# Patient Record
Sex: MALE | Race: BLACK OR AFRICAN AMERICAN | NOT HISPANIC OR LATINO | Employment: FULL TIME | URBAN - METROPOLITAN AREA
[De-identification: names, ages, dates, MRNs, and addresses within clinical notes are randomized per-mention and may not be internally consistent; named-entity substitution may affect disease eponyms.]

---

## 2017-09-05 ENCOUNTER — OFFICE VISIT (OUTPATIENT)
Dept: URGENT CARE | Facility: CLINIC | Age: 27
End: 2017-09-05
Payer: COMMERCIAL

## 2017-09-05 VITALS
OXYGEN SATURATION: 100 % | WEIGHT: 150 LBS | BODY MASS INDEX: 21.47 KG/M2 | RESPIRATION RATE: 18 BRPM | TEMPERATURE: 98 F | DIASTOLIC BLOOD PRESSURE: 83 MMHG | HEIGHT: 70 IN | HEART RATE: 75 BPM | SYSTOLIC BLOOD PRESSURE: 121 MMHG

## 2017-09-05 DIAGNOSIS — J02.9 SORE THROAT: Primary | ICD-10-CM

## 2017-09-05 LAB
CTP QC/QA: YES
S PYO RRNA THROAT QL PROBE: NEGATIVE

## 2017-09-05 PROCEDURE — 3008F BODY MASS INDEX DOCD: CPT | Mod: S$GLB,,, | Performed by: FAMILY MEDICINE

## 2017-09-05 PROCEDURE — 87880 STREP A ASSAY W/OPTIC: CPT | Mod: QW,S$GLB,, | Performed by: FAMILY MEDICINE

## 2017-09-05 PROCEDURE — 99203 OFFICE O/P NEW LOW 30 MIN: CPT | Mod: S$GLB,,, | Performed by: FAMILY MEDICINE

## 2017-09-05 NOTE — PROGRESS NOTES
"Subjective:       Patient ID: Elias Jackson is a 26 y.o. male.    Vitals:  height is 5' 10" (1.778 m) and weight is 68 kg (150 lb). His oral temperature is 97.5 °F (36.4 °C). His blood pressure is 121/83 and his pulse is 75. His respiration is 18 and oxygen saturation is 100%.     Chief Complaint: Sore Throat    Sore Throat    This is a new problem. The current episode started in the past 7 days. The problem has been gradually worsening. There has been no fever. The pain is at a severity of 6/10. The pain is moderate. Associated symptoms include swollen glands and trouble swallowing. Pertinent negatives include no abdominal pain, congestion, coughing, ear pain, headaches, hoarse voice or shortness of breath. He has had no exposure to strep or mono. Treatments tried: dayquil and nyquil. The treatment provided mild relief.     Review of Systems   Constitution: Negative for chills, fever and malaise/fatigue.   HENT: Positive for sore throat and trouble swallowing. Negative for congestion, ear pain and hoarse voice.    Eyes: Negative for discharge and redness.   Cardiovascular: Negative for chest pain, dyspnea on exertion and leg swelling.   Respiratory: Negative for cough, shortness of breath, sputum production and wheezing.    Musculoskeletal: Negative for myalgias.   Gastrointestinal: Negative for abdominal pain and nausea.   Neurological: Negative for headaches.       Objective:      Physical Exam   Constitutional: He appears well-developed and well-nourished.   HENT:   Head: Normocephalic and atraumatic.   Mouth/Throat: Posterior oropharyngeal erythema present. No posterior oropharyngeal edema.   Neck: Normal range of motion. Neck supple.   Cardiovascular: Normal rate, regular rhythm and normal heart sounds.    Pulmonary/Chest: Effort normal and breath sounds normal.   Nursing note and vitals reviewed.      Assessment:       1. Sore throat        Plan:         Sore throat  -     POCT rapid strep A    recommend " OTC analgesia

## 2017-09-05 NOTE — PATIENT INSTRUCTIONS
Viral Pharyngitis (Sore Throat)    You (or your child, if your child is the patient) have pharyngitis (sore throat). This infection is caused by a virus. It can cause throat pain that is worse when swallowing, aching all over, headache, and fever. The infection may be spread by coughing, kissing, or touching others after touching your mouth or nose. Antibiotic medications do not work against viruses, so they are not used for treating this condition.  Home care  · If your symptoms are severe, rest at home. Return to work or school when you feel well enough.   · Drink plenty of fluids to avoid dehydration.  · For children: Use acetaminophen for fever, fussiness or discomfort. In infants over six months of age, you may use ibuprofen instead of acetaminophen. (NOTE: If your child has chronic liver or kidney disease or ever had a stomach ulcer or GI bleeding, talk with your doctor before using these medicines.) (NOTE: Aspirin should never be used in anyone under 18 years of age who is ill with a fever. It may cause severe liver damage.)   · For adults: You may use acetaminophen or ibuprofen to control pain or fever, unless another medicine was prescribed for this. (NOTE: If you have chronic liver or kidney disease or ever had a stomach ulcer or GI bleeding, talk with your doctor before using these medicines.)  · Throat lozenges or numbing throat sprays can help reduce pain. Gargling with warm salt water will also help reduce throat pain. For this, dissolve 1/2 teaspoon of salt in 1 glass of warm water. To help soothe a sore throat, children can sip on juice or a popsicle. Children 5 years and older can also suck on a lollipop or hard candy.  · Avoid salty or spicy foods, which can be irritating to the throat.  Follow-up care  Follow up with your healthcare provider or our staff if you are not improving over the next week.  When to seek medical advice  Call your healthcare provider right away if any of these  occur:  · Fever as directed by your doctor.  For children, seek care if:  ¨ Your child is of any age and has repeated fevers above 104°F (40°C).  ¨ Your child is younger than 2 years of age and has a fever of 100.4°F (38°C) that continues for more than 1 day.  ¨ Your child is 2 years old or older and has a fever of 100.4°F (38°C) that continues for more than 3 days.  · New or worsening ear pain, sinus pain, or headache  · Painful lumps in the back of neck  · Stiff neck  · Lymph nodes are getting larger  · Inability to swallow liquids, excessive drooling, or inability to open mouth wide due to throat pain  · Signs of dehydration (very dark urine or no urine, sunken eyes, dizziness)  · Trouble breathing or noisy breathing  · Muffled voice  · New rash  · Child appears to be getting sicker  Date Last Reviewed: 4/13/2015  © 1314-3359 [employer]. [address] [place] [address], [place] [address]. All rights reserved. This information is not intended as a substitute for professional medical care. Always follow your healthcare professional's instructions.

## 2020-01-24 ENCOUNTER — OFFICE VISIT (OUTPATIENT)
Dept: INTERNAL MEDICINE | Facility: CLINIC | Age: 30
End: 2020-01-24
Payer: COMMERCIAL

## 2020-01-24 VITALS
DIASTOLIC BLOOD PRESSURE: 90 MMHG | BODY MASS INDEX: 24.87 KG/M2 | SYSTOLIC BLOOD PRESSURE: 130 MMHG | WEIGHT: 173.75 LBS | HEIGHT: 70 IN | HEART RATE: 73 BPM | OXYGEN SATURATION: 98 %

## 2020-01-24 DIAGNOSIS — R03.0 ELEVATED BLOOD PRESSURE READING IN OFFICE WITHOUT DIAGNOSIS OF HYPERTENSION: ICD-10-CM

## 2020-01-24 DIAGNOSIS — Z00.00 ANNUAL PHYSICAL EXAM: Primary | ICD-10-CM

## 2020-01-24 DIAGNOSIS — Z13.1 SCREENING FOR DIABETES MELLITUS: ICD-10-CM

## 2020-01-24 DIAGNOSIS — Z13.220 SCREENING FOR LIPID DISORDERS: ICD-10-CM

## 2020-01-24 PROCEDURE — 99999 PR PBB SHADOW E&M-EST. PATIENT-LVL III: CPT | Mod: PBBFAC,,, | Performed by: FAMILY MEDICINE

## 2020-01-24 PROCEDURE — 99385 PR PREVENTIVE VISIT,NEW,18-39: ICD-10-PCS | Mod: S$GLB,,, | Performed by: FAMILY MEDICINE

## 2020-01-24 PROCEDURE — 99385 PREV VISIT NEW AGE 18-39: CPT | Mod: S$GLB,,, | Performed by: FAMILY MEDICINE

## 2020-01-24 PROCEDURE — 99999 PR PBB SHADOW E&M-EST. PATIENT-LVL III: ICD-10-PCS | Mod: PBBFAC,,, | Performed by: FAMILY MEDICINE

## 2020-01-24 NOTE — PROGRESS NOTES
"Subjective:       Patient ID: Elias Jackson is a 29 y.o. male.    Chief Complaint: Establish Care and Annual Exam    HPI  This patient is new to me.   Elias Jackson is a 29 y.o. year old male without significant PMH who presents today to establish care.    Patient is overall feeling well and has no concerns or complaints.  His blood pressure is elevated today.  He denies any history of hypertension.    Exercise - none  Diet - fast food often, fried chicken    Health Maintenance  Colon Cancer Screening: n/a  Prostate Cancer Screening: n/a  Hepatitis C screening: n/a  Flu vaccine: UTD  Tetanus vaccine: due  PNA vaccine: n/a  Shingles vaccine: n/a    I personally reviewed Past Medical History, Past Surgical History, Social History, and Family History    Review of Systems   Constitutional: Negative for chills, fatigue, fever and unexpected weight change.   HENT: Negative for congestion, hearing loss, rhinorrhea and sore throat.    Eyes: Negative for visual disturbance.   Respiratory: Negative for cough, shortness of breath and wheezing.    Cardiovascular: Negative for chest pain, palpitations and leg swelling.   Gastrointestinal: Negative for abdominal pain, constipation, diarrhea, nausea and vomiting.   Genitourinary: Negative for dysuria, frequency and urgency.   Musculoskeletal: Negative for arthralgias and myalgias.   Skin: Negative for rash.   Neurological: Negative for dizziness, syncope and headaches.   Psychiatric/Behavioral: Negative for dysphoric mood and sleep disturbance. The patient is not nervous/anxious.        Objective:      Vitals:    01/24/20 1357 01/24/20 1441   BP: (!) 142/102 (!) 130/90   Pulse: 73    SpO2: 98%    Weight: 78.8 kg (173 lb 11.6 oz)    Height: 5' 10" (1.778 m)      Physical Exam   Constitutional: He is oriented to person, place, and time. He appears well-developed and well-nourished. No distress.   HENT:   Head: Normocephalic and atraumatic.   Right Ear: Hearing normal.   Left " Ear: Hearing normal.   Nose: Nose normal.   Mouth/Throat: Oropharynx is clear and moist and mucous membranes are normal. Normal dentition. No oropharyngeal exudate.   Eyes: Pupils are equal, round, and reactive to light. Conjunctivae and lids are normal.   Neck: Normal range of motion. No thyroid mass and no thyromegaly present.   Cardiovascular: Normal rate, regular rhythm, S1 normal, S2 normal and intact distal pulses.   No murmur heard.  No lower extremity edema   Pulmonary/Chest: Effort normal and breath sounds normal. No respiratory distress.   Abdominal: Soft. Bowel sounds are normal. There is no tenderness.   Lymphadenopathy:     He has no cervical adenopathy.        Right: No supraclavicular adenopathy present.        Left: No supraclavicular adenopathy present.   Neurological: He is alert and oriented to person, place, and time. He is not disoriented.   Skin: Skin is warm and dry. No rash noted.   Psychiatric: He has a normal mood and affect. His behavior is normal. Thought content normal.   Nursing note and vitals reviewed.      Assessment:       1. Annual physical exam    2. Screening for diabetes mellitus    3. Screening for lipid disorders    4. Elevated blood pressure reading in office without diagnosis of hypertension        Plan:     Elias was seen today for establish care and annual exam.    Diagnoses and all orders for this visit:    Annual physical exam  Recommended Tdap vaccine.  -     CBC auto differential; Future  -     Comprehensive metabolic panel; Future  -     Lipid panel; Future  -     TSH; Future    Screening for diabetes mellitus  -     Comprehensive metabolic panel; Future    Screening for lipid disorders  -     Lipid panel; Future    Elevated blood pressure reading in office without diagnosis of hypertension  Discussed lifestyle changes including low sodium diet, limiting alcohol, avoiding tobacco use, regular exercise.  Advised patient to monitor blood pressure at home/work and he  will contact with an update on how his blood pressure is.

## 2020-01-24 NOTE — PATIENT INSTRUCTIONS
High Blood Pressure    A blood pressure reading is made up of two numbers: a higher number over a lower number. The top number is the systolic pressure. The bottom number is the diastolic pressure. A normal blood pressure is a systolic pressure of less than 120 over a diastolic pressure less than 80. You will see your blood pressure readings written together. For example, a person with a systolic pressure of 118 and a diastolic pressure of 78 will have 118/78 written in the medical record.  High blood pressure is when either the top number is 130 or higher, or the bottom number is 80 or higher. High blood pressure is diagnosed when multiple, separate readings show blood pressures above 130/80. The blood pressures between normal and high are called prehypertension.  Home care  If you have high blood pressure, you should do what is listed below to lower your blood pressure. If you are taking medicines for high blood pressure, these methods may reduce or end your need for medicines in the future.  · Begin a weight-loss program if you are overweight.  · Cut back on how much salt you get in your diet. Heres how to do this:  ¨ Dont eat foods that have a lot of salt. These include olives, pickles, smoked meats, and salted potato chips.  ¨ Dont add salt to your food at the table.  ¨ Use only small amounts of salt when cooking.  ¨ Review food labels to track how much salt is in prepared foods.  ¨ When eating out, ask that no additional salt be added to your food order.  · Begin an exercise program. Talk with your health care provider about the type of exercise program that would be best for you. It doesn't have to be hard. Even brisk walking for 20 minutes 3 times a week is a good form of exercise.  · Dont take medicines that have heart stimulants. This includes many over-the-counter cold and sinus decongestant pills and sprays, as well as diet pills. Check the warnings about hypertension on the label. Before purchasing  any over-the-counter medicines or supplements, always ask the pharmacist about the product's potential interaction with your high blood pressure and your high blood pressure medicines.  · Stimulants such as amphetamine or cocaine could be lethal for someone with high blood pressure. Never take these.  · Limit how much caffeine you get in your diet. Switch to caffeine-free products.  · Stop smoking. If you are a long-time smoker, this can be hard. Enroll in a stop-smoking program to make it more likely that you will quit for good.  · Learn how to handle stress. This is an important part of any program to lower blood pressure. Learn about relaxation methods like meditation, yoga, or biofeedback.  · If your provider prescribed medicines, take them exactly as directed. Missing doses may cause your blood pressure get out of control.  · If you miss a dose or doses, check with your healthcare provider or pharmacist about what to do.  · Consider buying an automatic blood pressure machine. Your provider can make a recommendation. You can get one of these at most pharmacies.  The American Heart Association recommends the following guidelines for home blood pressure monitoring:  · Don't smoke or drink coffee for 30 minutes before taking your blood pressure.  · Go to the bathroom before the test.  · Relax for 5 minutes before taking the measurement.  · Sit with your back supported (don't sit on a couch or soft chair); keep your feet on the floor uncrossed. Place your arm on a solid flat surface (like a table) with the upper part of the arm at heart level. Place the middle of the cuff directly above the eye of the elbow. Check the monitor's instruction manual for an illustration.  · Take multiple readings. When you measure, take 2 to 3 readings one minute apart and record all of the results.  · Take your blood pressure at the same time every day, or as your healthcare provider recommends.  · Record the date, time, and blood  pressure reading.  · Take the record with you to your next medical appointment. If your blood pressure monitor has a built-in memory, simply take the monitor with you to your next appointment.  · Call your provider if you have several high readings. Don't be frightened by a single high blood pressure reading, but if you get several high readings, check in with your healthcare provider.  · Note: When blood pressure reaches a systolic (top number) of 180 or higher OR diastolic (bottom number) of 110 or higher, seek emergency medical treatment.  Follow-up care  If you started a new blood pressure medicine today, its important that you have your blood pressure rechecked. This is to make sure that the medicine is working and that you have no serious side effects. Keep all your follow up appointments. Write down medicine and blood pressure questions and bring them to your next appointment. If you have pressing concerns about your new medicine or your blood pressure, call your provider.   When to seek medical advice  Call your healthcare provider right away if any of these occur:  · Blood pressure reaches a systolic (top number) of 180 or higher, OR diastolic (bottom number) of 110 or higher, seek emergency medical treatment.  · Chest pain or shortness of breath  · Severe headache  · Throbbing or rushing sound in the ears  · Nosebleed  · Sudden severe pain in your belly (abdomen)  · Extreme drowsiness, confusion, or fainting  · Dizziness or dizziness with a spinning sensation (vertigo)  · Weakness of an arm or leg or one side of the face  · You have problems speaking or seeing   Date Last Reviewed: 12/1/2016  © 1339-7727 [employer]. [address] [place] [address], [place] [address]. All rights reserved. This information is not intended as a substitute for professional medical care. Always follow your healthcare professional's instructions.      Controlling High Blood Pressure  High blood pressure (hypertension) is  often called the silent killer. This is because many people who have it dont know it. High blood pressure is defined as 130/80 mm Hg or higher. Know your blood pressure and remember to check it regularly. Doing so can save your life. Here are some things you can do to help control your blood pressure.    Choose heart-healthy foods  · Select low-salt, low-fat foods. Limit sodium intake to 2,000 mg per day or the amount suggested by your healthcare provider.  · Limit canned, dried, cured, packaged, and fast foods. These can contain a lot of salt.  · Eat 8 to 10 servings of fruits and vegetables every day.  · Choose lean meats, fish, or chicken.  · Eat whole-grain pasta, brown rice, and beans.  · Eat 2 to 3 servings of low-fat or fat-free dairy products.  · Ask your doctor about the DASH eating plan or Mediterranean diet. This plan helps reduce blood pressure.  · When you go to a restaurant, ask that your meal be prepared with no added salt.  Maintain a healthy weight  · Ask your healthcare provider how many calories to eat a day. Then stick to that number.  · Ask your healthcare provider what weight range is healthiest for you. If you are overweight, a weight loss of only 3% to 5% of your body weight can help lower blood pressure. Generally, a good weight loss goal is to lose 10% of your body weight in a year.  · Limit snacks and sweets.  · Get regular exercise.  Get up and get active  · Choose activities you enjoy. Find ones you can do with friends or family. This includes bicycling, dancing, walking, and jogging.  · Park farther away from building entrances.  · Use stairs instead of the elevator.  · When you can, walk or bike instead of driving.  · [place] leaves, garden, or do household repairs.  · Be active at a moderate to vigorous level of physical activity for at least 40 minutes for a minimum of 3 to 4 days a week.   Manage stress  · Make time to relax and enjoy life. Find time to laugh.  · Communicate your  concerns with your loved ones and your healthcare provider.  · Visit with family and friends, and keep up with hobbies.  Limit alcohol and quit smoking  · Men should have no more than 2 drinks per day.  · Women should have no more than 1 drink per day.  · Talk with your healthcare provider about quitting smoking. Smoking significantly increases your risk for heart disease and stroke. Ask your healthcare provider about community smoking cessation programs and other options.  Medicines  If lifestyle changes arent enough, your healthcare provider may prescribe high blood pressure medicine. Take all medicines as prescribed. If you have any questions about your medicines, ask your healthcare provider before stopping or changing them.   Date Last Reviewed: 4/27/2016 © 2000-2017 [employer]. [address] [place] [address], [place] [address]. All rights reserved. This information is not intended as a substitute for professional medical care. Always follow your healthcare professional's instructions.        Aerobic Exercise for a Healthy Heart  Exercise is a lot more than an energy booster and a stress reliever. It also strengthens your heart muscle, lowers your blood pressure and cholesterol, and burns calories. It can also improve your resting muscle tone, and your mood.     Remember, some activity is better than none.    Choose an aerobic activity  Choose an activity that makes your heart and lungs work harder than they do when you rest or walk normally. This aerobic exercise can improve the way your heart and other muscles use oxygen. Make it fun by exercising with a friend and choosing an activity you enjoy. Here are some ideas:  · Walking  · Swimming  · Bicycling  · Stair climbing  · Dancing  · Jogging  · Gardening  Exercise regularly  If you havent been exercising regularly,  get your doctors OK first. Then start slowly.  Here are some tips:  · Begin exercising 3 times a week for 5 to 10 minutes at a  time.  · When you feel comfortable, add a few minutes each session.  · Slowly build up to exercising 3 to 4 times each week. Each session should last for 40 minutes, on average, and involve moderate- to vigorous-intensity physical activity.  · If you have been given nitroglycerin, be sure to carry it when you exercise.  · If you get chest pain (angina) when youre exercising, stop what youre doing, take your nitroglycerin, and call your doctor.  Date Last Reviewed: 6/2/2016 © 2000-2017 [employer]. [address] [place] [address]. All rights reserved. This information is not intended as a substitute for professional medical care. Always follow your healthcare professional's instructions.        Eating Heart-Healthy Foods  Eating has a big impact on your heart health. In fact, eating healthier can improve several of your heart risks at once. For instance, it helps you manage weight, cholesterol, and blood pressure. Here are ideas to help you make heart-healthy changes without giving up all the foods and flavors you love.  Getting started  · Talk with your health care provider about eating plans, such as the DASH or Mediterranean diet. You may also be referred to a dietitian.  · Change a few things at a time. Give yourself time to get used to a few eating changes before adding more.  · Work to create a tasty, healthy eating plan that you can stick to for the rest of your life.    Goals for healthy eating  Below are some tips to improve your eating habits:  · Limit saturated fats and trans fats. Saturated fats raise your levels of cholesterol, so keep these fats to a minimum. They are found in foods such as fatty meats, whole milk, cheese, and palm and coconut oils. Avoid trans fats because they lower good cholesterol as well as raise bad cholesterol. Trans fats are most often found in processed foods.  · Reduce sodium (salt) intake. Eating too much salt may increase your blood pressure. Limit  your sodium intake to 2,000 milligrams (mg) per day, or less if your health care provider recommends it. Dining out less often and eating fewer processed foods are two great ways to decrease the amount of salt you consume.  · Managing calories. A calorie is a unit of energy. Your body burns calories for fuel, but if you eat more calories than your body burns, the extras are stored as fat. Your health care provider can help you create a diet plan to manage your calories. This will likely include eating healthier foods as well as exercising regularly. To help you track your progress, keep a diary to record what you eat and how often you exercise.  Choose the right foods  Aim to make these foods staples of your diet. If you have diabetes, you may have different recommendations than what is listed here:  · Fruits and vegetable provide plenty of nutrients without a lot of calories. At meals, fill half your plate with these foods. Split the other half of your plate between whole grains and lean protein.  · Whole grains are high in fiber and rich in vitamins and nutrients. Good choices include whole-wheat bread, pasta, and brown rice.  · Lean proteins give you nutrition with less fat. Good choices include fish, skinless chicken, and beans.  · Low-fat or nonfat dairy provides nutrients without a lot of fat. Try low-fat or nonfat milk, cheese, or yogurt.  · Healthy fats can be good for you in small amounts. These are unsaturated fats, such as olive oil, nuts, and fish. Try to have at least 2 servings per week of fatty fish such as salmon, sardines, mackerel, rainbow trout, and albacore tuna. These contain omega-3 fatty acids, which are good for your heart. Flaxseed is another source of a heart-healthy fat.  More on heart healthy eating    Read food labels  Healthy eating starts at the grocery store. Be sure to pay attention to food labels on packaged foods. Look for products that are high in fiber and protein, and low in  saturated fat, cholesterol, and sodium. Avoid products that contain trans fat. And pay close attention to serving size. For instance, if you plan to eat two servings, double all the numbers on the label.  Prepare food right  A key part of healthy cooking is cutting down on added fat and salt. Look on the internet for lower-fat, lower-sodium recipes. Also, try these tips:  · Remove fat from meat and skin from poultry before cooking.  · Skim fat from the surface of soups and sauces.  · Broil, boil, bake, steam, grill, and microwave food without added fats.  · Choose ingredients that spice up your food without adding calories, fat, or sodium. Try these items: horseradish, hot sauce, lemon, mustard, nonfat salad dressings, and vinegar. For salt-free herbs and spices, try basil, cilantro, cinnamon, pepper, and rosemary.  Date Last Reviewed: 6/25/2015 © 2000-2017 [employer]. [address] [place] [address]. All rights reserved. This information is not intended as a substitute for professional medical care. Always follow your healthcare professional's instructions.        Taking Your Blood Pressure  Blood pressure is the force of blood against the artery wall as it moves from the heart through the blood vessels. You can take your own blood pressure reading using a digital monitor. Take your readings the same each time, using the same arm. Take readings as often as your healthcare provider instructs.  About blood pressure monitors  Blood pressure monitors are designed for certain ages and cases. You can find monitors for older adults, for pregnant women, and for children. Make sure the one you choose is the right one for your age and situation.  The American Heart Association recommends an automatic cuff monitor that fits on your upper arm (bicep). The cuff should fit your arm size. A cuff thats too large or too small will not give an accurate reading. Measure around your upper arm to find your  size.  Monitors that attach to your finger or wrist are not as accurate as monitors for your upper arm.  Ask your healthcare provider for help in choosing a monitor. Bring your monitor to your next provider visit if you need help in using it the correct way.  The steps below are general instructions for using an automatic digital monitor.  Step 1. Relax    · Take your blood pressure at the same time every day, such as in the morning or evening, or at the time your healthcare provider recommends.  · Wait at least a half-hour after smoking, eating, or exercising. Don't drink coffee, tea, soda, or other caffeinated beverages before checking your blood pressure.  · Sit comfortably at a table with both feet on the floor. Do not cross your legs or feet. Place the monitor near you.  · Rest for a few minutes before you begin.  Step 2. Wrap the cuff    · Place your arm on the table, palm up. Your arm should be at the level of your heart. Wrap the cuff around your upper arm, just above your elbow. Its best done on bare skin, not over clothing. Most cuffs will indicate where the brachial artery (the blood vessel in the middle of the arm at the inner side of the elbow) should line up with the cuff. Look in your monitor's instruction booklet for an illustration. You can also bring your cuff to your healthcare provider and have them show you how to correctly place the cuff.  Step 3. Inflate the cuff    · Push the button that starts the pump.  · The cuff will tighten, then loosen.  · The numbers will change. When they stop changing, your blood pressure reading will appear.  · Take 2 or 3 readings one minute apart.  Step 4. Write down the results of each reading    · Write down your blood pressure numbers for each reading. Note the date and time. Keep your results in one place, such as a notebook. Even if your monitor has a built-in memory, keep a hard copy of the readings.  · Remove the cuff from your arm. Turn off the  machine.  · Bring your blood pressure records with your healthcare providers at each visit.  · If you start a new blood pressure medicine, note the day you started the new medicine. Also note the day if you change the dose of your medicine. This information goes on your blood pressure recording sheet. This will help your healthcare provider monitor how well the medicine changes are working.  · Ask your healthcare provider what numbers should prompt you to call him or her. Also ask what numbers should prompt you to get help right away.  Date Last Reviewed: 11/1/2016 © 2000-2017 [employer]. [address] [place] [address]. All rights reserved. This information is not intended as a substitute for professional medical care. Always follow your healthcare professional's instructions.

## 2020-04-21 DIAGNOSIS — Z01.84 ANTIBODY RESPONSE EXAMINATION: ICD-10-CM

## 2020-05-21 DIAGNOSIS — Z01.84 ANTIBODY RESPONSE EXAMINATION: ICD-10-CM

## 2020-06-20 DIAGNOSIS — Z01.84 ANTIBODY RESPONSE EXAMINATION: ICD-10-CM

## 2020-07-20 DIAGNOSIS — Z01.84 ANTIBODY RESPONSE EXAMINATION: ICD-10-CM

## 2020-08-16 ENCOUNTER — OFFICE VISIT (OUTPATIENT)
Dept: URGENT CARE | Facility: CLINIC | Age: 30
End: 2020-08-16
Payer: COMMERCIAL

## 2020-08-16 VITALS
DIASTOLIC BLOOD PRESSURE: 87 MMHG | BODY MASS INDEX: 24.82 KG/M2 | WEIGHT: 173 LBS | SYSTOLIC BLOOD PRESSURE: 131 MMHG | OXYGEN SATURATION: 99 % | RESPIRATION RATE: 18 BRPM | TEMPERATURE: 98 F | HEART RATE: 90 BPM

## 2020-08-16 DIAGNOSIS — R31.9 HEMATURIA, UNSPECIFIED TYPE: Primary | ICD-10-CM

## 2020-08-16 LAB
BILIRUB UR QL STRIP: NEGATIVE
GLUCOSE UR QL STRIP: NEGATIVE
KETONES UR QL STRIP: NEGATIVE
LEUKOCYTE ESTERASE UR QL STRIP: NEGATIVE
PH, POC UA: 6
POC BLOOD, URINE: POSITIVE
POC NITRATES, URINE: NEGATIVE
PROT UR QL STRIP: NEGATIVE
SP GR UR STRIP: 1.02 (ref 1–1.03)
UROBILINOGEN UR STRIP-ACNC: ABNORMAL (ref 0.3–2.2)

## 2020-08-16 PROCEDURE — 87491 CHLMYD TRACH DNA AMP PROBE: CPT

## 2020-08-16 PROCEDURE — 96372 THER/PROPH/DIAG INJ SC/IM: CPT | Mod: S$GLB,,, | Performed by: NURSE PRACTITIONER

## 2020-08-16 PROCEDURE — 81003 URINALYSIS AUTO W/O SCOPE: CPT | Mod: QW,S$GLB,, | Performed by: NURSE PRACTITIONER

## 2020-08-16 PROCEDURE — 96372 PR INJECTION,THERAP/PROPH/DIAG2ST, IM OR SUBCUT: ICD-10-PCS | Mod: S$GLB,,, | Performed by: NURSE PRACTITIONER

## 2020-08-16 PROCEDURE — 99214 OFFICE O/P EST MOD 30 MIN: CPT | Mod: 25,S$GLB,, | Performed by: NURSE PRACTITIONER

## 2020-08-16 PROCEDURE — 87086 URINE CULTURE/COLONY COUNT: CPT

## 2020-08-16 PROCEDURE — 99214 PR OFFICE/OUTPT VISIT, EST, LEVL IV, 30-39 MIN: ICD-10-PCS | Mod: 25,S$GLB,, | Performed by: NURSE PRACTITIONER

## 2020-08-16 PROCEDURE — 81003 POCT URINALYSIS, DIPSTICK, AUTOMATED, W/O SCOPE: ICD-10-PCS | Mod: QW,S$GLB,, | Performed by: NURSE PRACTITIONER

## 2020-08-16 RX ORDER — AZITHROMYCIN 250 MG/1
TABLET, FILM COATED ORAL
Qty: 4 TABLET | Refills: 0 | Status: SHIPPED | OUTPATIENT
Start: 2020-08-16

## 2020-08-16 RX ORDER — AZITHROMYCIN 250 MG/1
TABLET, FILM COATED ORAL
Qty: 4 TABLET | Refills: 0 | Status: SHIPPED | OUTPATIENT
Start: 2020-08-16 | End: 2020-08-16

## 2020-08-16 RX ORDER — CEFTRIAXONE 250 MG/1
250 INJECTION, POWDER, FOR SOLUTION INTRAMUSCULAR; INTRAVENOUS
Status: COMPLETED | OUTPATIENT
Start: 2020-08-16 | End: 2020-08-16

## 2020-08-16 RX ADMIN — CEFTRIAXONE 250 MG: 250 INJECTION, POWDER, FOR SOLUTION INTRAMUSCULAR; INTRAVENOUS at 02:08

## 2020-08-16 NOTE — PATIENT INSTRUCTIONS
Rocephin given here in clinic.  Go ahead and take 4 azithromycin pills at once.  Abstain from intercourse for 7 days post treatment.  Cultures pending, we will call you with these results.  As discussed the Gonorrhea and Chlamydia test may take longer.  Follow up with PCP or urology for blood in your urine.  A referral was placed for you, call [phone] to schedule appointment.  Stay hydrated.  Go to the ER for any increasing symptoms including pain to your flank or pelvic region.    Blood in the Urine    Blood in the urine (hematuria) has many possible causes. If it occurs after an injury (such as a car accident or fall), it is most often a sign of bruising to the kidney or bladder. Common causes of blood in the urine include urinary tract infections, kidney stones, inflammation, tumors, or certain other diseases of the kidney or bladder. Menstruation can cause blood to appear in the urine sample, although it is not coming from the urinary tract.  If only a trace amount of blood is present, it will show up on the urine test, even though the urine may be yellow and not pink or red. This may occur with any of the above conditions, as well as heavy exercise or high fever. In this case, your doctor may want to repeat the urine test on another day. This will show if the blood is still present. If it is, then other tests can be done to find out the cause.  Home care  Follow these home care guidelines:  · If your urine does not appear bloody (pink, brown or red) then you do not need to restrict your activity in any way.  · If you can see blood in your urine, rest and avoid heavy exertion until your next exam. Do not use aspirin, blood thinners, or anti-platelet or anti-inflammatory medicines. These include ibuprofen and naproxen. These thin the blood and may increase bleeding.  Follow-up care  Follow up with your healthcare provider, or as advised. If you were injured and had blood in your urine, you should have a repeat  urine test in 1 to 2 days. Contact your doctor for this test.  A radiologist will review any X-rays that were taken. You will be told of any new findings that may affect your care.  When to seek medical advice  Call your healthcare provider right away if any of these occur:  · Bright red blood or blood clots in the urine (if you did not have this before)  · Weakness, dizziness or fainting  · New groin, abdominal, or back pain  · Fever of 100.4ºF (38ºC) or higher, or as directed by your healthcare provider  · Repeated vomiting  · Bleeding from the nose or gums or easy bruising  Date Last Reviewed: 9/1/2016  © 4070-9612 [employer]. [address] [place] [address], [place] [address]. All rights reserved. This information is not intended as a substitute for professional medical care. Always follow your healthcare professional's instructions.

## 2020-08-16 NOTE — PROGRESS NOTES
Subjective:       Patient ID: Elias Jackson is a 29 y.o. male.    Vitals:  weight is 78.5 kg (173 lb). His temperature is 98.4 °F (36.9 °C). His blood pressure is 131/87 and his pulse is 90. His respiration is 18 and oxygen saturation is 99%.     Chief Complaint: Hematuria    Patient presents with concern for blood in his urine.  He noted this Saturday (yesterday) afternoon on a towel and thought maybe the towel was dirty as he was staying in a hotel.  States that he noticed it again Saturday evening.  He did notice this after having an erection and having protected vaginal sex (condom) with a new female partner on Saturday.  He did have unprotected oral sex with a female partner about two weeks ago, unsure of an STD contact.  Denies a history of STDs.  Denies any associated pain with urination, flank pain, abdominal pain, testicular pain, or pelvic pain.  Denies a history of kidney stones.      Hematuria  This is a new problem. The current episode started yesterday. The problem is unchanged. He is experiencing no pain. Urine color: bright red. Irritative symptoms do not include frequency, nocturia or urgency. Pertinent negatives include no abdominal pain, chills, dysuria, facial swelling, fever, flank pain, genital pain, hesitancy, inability to urinate, nausea, vomiting or sore throat. He is sexually active. There is no history of hypertension,  trauma, kidney stones, recent infection, sickle cell disease, STDs, tobacco use or UTI.       Constitution: Negative for chills and fever.   HENT: Negative for facial swelling and sore throat.    Neck: Negative for painful lymph nodes.   Gastrointestinal: Negative for abdominal pain, nausea and vomiting.   Genitourinary: Positive for hematuria. Negative for dysuria, frequency, urgency, urine decreased, flank pain, history of kidney stones, genital trauma, painful intercourse, genital sore, penile discharge, painful ejaculation, penile pain, penile swelling, scrotal  swelling and testicular pain.   Musculoskeletal: Negative for back pain.   Skin: Negative for rash and lesion.   Hematologic/Lymphatic: Negative for swollen lymph nodes.       Objective:      Physical Exam   Constitutional: He is oriented to person, place, and time. He appears well-developed. No distress.   HENT:   Head: Normocephalic and atraumatic.   Ears:   Right Ear: External ear normal.   Left Ear: External ear normal.   Nose: Nose normal. No nasal deformity. No epistaxis.   Mouth/Throat: Oropharynx is clear and moist and mucous membranes are normal.   Eyes: Conjunctivae and lids are normal.   Neck: Trachea normal, normal range of motion and phonation normal. Neck supple.   Cardiovascular: Normal rate, regular rhythm and normal heart sounds.   Pulmonary/Chest: Effort normal and breath sounds normal.   Abdominal: Soft. Normal appearance and bowel sounds are normal. He exhibits no distension. There is no abdominal tenderness.   Genitourinary:    Testes normal.   Cremasteric reflex is present. Circumcised. No phimosis, paraphimosis, hypospadias, penile erythema, penile tenderness or penile swelling. Penis exhibits no lesions. No discharge found.   Musculoskeletal: Normal range of motion.   Neurological: He is alert and oriented to person, place, and time. He has normal reflexes.   Skin: Skin is warm, dry, intact and not diaphoretic. Psychiatric: His speech is normal and behavior is normal. Judgment and thought content normal.   Nursing note and vitals reviewed.    Results for orders placed or performed in visit on 08/16/20   POCT Urinalysis, Dipstick, Automated, W/O Scope   Result Value Ref Range    POC Blood, Urine Positive (A) Negative    POC Bilirubin, Urine Negative Negative    POC Urobilinogen, Urine abnormal 0.3 - 2.2    POC Ketones, Urine Negative Negative    POC Protein, Urine Negative Negative    POC Nitrates, Urine Negative Negative    POC Glucose, Urine Negative Negative    pH, UA 6.0     POC Specific  Gravity, Urine 1.020 1.003 - 1.029    POC Leukocytes, Urine Negative Negative         Assessment:       1. Hematuria, unspecified type        Plan:       Poct UA does show 250rbcs to urine.  Due to Covid 19 there is currently a delay time on GC testing.  Patient does not know of any GC contact but does admit to unprotected oral sex about 14 days ago.  I discussed options with him and given possible delay on results we will go ahead and cover him for a GC cause of possible Urethritis associated with his hematuria.  Hematuria, unspecified type  -     POCT Urinalysis, Dipstick, Automated, W/O Scope  -     Urine culture  -     C. trachomatis/N. gonorrhoeae by AMP DNA  -     cefTRIAXone injection 250 mg  -     Discontinue: azithromycin (Z-INDRA) 250 MG tablet; 4 pills po once  Dispense: 4 tablet; Refill: 0  -     Ambulatory referral/consult to Urology  -     azithromycin (Z-INDRA) 250 MG tablet; 4 pills po once  Dispense: 4 tablet; Refill: 0      Patient Instructions   Rocephin given here in clinic.  Go ahead and take 4 azithromycin pills at once.  Abstain from intercourse for 7 days post treatment.  Cultures pending, we will call you with these results.  As discussed the Gonorrhea and Chlamydia test may take longer.  Follow up with PCP or urology for blood in your urine.  A referral was placed for you, call [phone] to schedule appointment.  Stay hydrated.  Go to the ER for any increasing symptoms including pain to your flank or pelvic region.    Blood in the Urine    Blood in the urine (hematuria) has many possible causes. If it occurs after an injury (such as a car accident or fall), it is most often a sign of bruising to the kidney or bladder. Common causes of blood in the urine include urinary tract infections, kidney stones, inflammation, tumors, or certain other diseases of the kidney or bladder. Menstruation can cause blood to appear in the urine sample, although it is not coming from the urinary tract.  If only a  trace amount of blood is present, it will show up on the urine test, even though the urine may be yellow and not pink or red. This may occur with any of the above conditions, as well as heavy exercise or high fever. In this case, your doctor may want to repeat the urine test on another day. This will show if the blood is still present. If it is, then other tests can be done to find out the cause.  Home care  Follow these home care guidelines:  · If your urine does not appear bloody (pink, brown or red) then you do not need to restrict your activity in any way.  · If you can see blood in your urine, rest and avoid heavy exertion until your next exam. Do not use aspirin, blood thinners, or anti-platelet or anti-inflammatory medicines. These include ibuprofen and naproxen. These thin the blood and may increase bleeding.  Follow-up care  Follow up with your healthcare provider, or as advised. If you were injured and had blood in your urine, you should have a repeat urine test in 1 to 2 days. Contact your doctor for this test.  A radiologist will review any X-rays that were taken. You will be told of any new findings that may affect your care.  When to seek medical advice  Call your healthcare provider right away if any of these occur:  · Bright red blood or blood clots in the urine (if you did not have this before)  · Weakness, dizziness or fainting  · New groin, abdominal, or back pain  · Fever of 100.4ºF (38ºC) or higher, or as directed by your healthcare provider  · Repeated vomiting  · Bleeding from the nose or gums or easy bruising  Date Last Reviewed: 9/1/2016 © 2000-2017 [employer]. [address] [place] [address], [place] [address]. All rights reserved. This information is not intended as a substitute for professional medical care. Always follow your healthcare professional's instructions.

## 2020-08-18 LAB — BACTERIA UR CULT: NO GROWTH

## 2020-08-19 DIAGNOSIS — Z01.84 ANTIBODY RESPONSE EXAMINATION: ICD-10-CM

## 2020-08-21 ENCOUNTER — OFFICE VISIT (OUTPATIENT)
Dept: UROLOGY | Facility: CLINIC | Age: 30
End: 2020-08-21
Payer: COMMERCIAL

## 2020-08-21 VITALS
HEIGHT: 70 IN | HEART RATE: 77 BPM | SYSTOLIC BLOOD PRESSURE: 120 MMHG | DIASTOLIC BLOOD PRESSURE: 84 MMHG | WEIGHT: 160.94 LBS | BODY MASS INDEX: 23.04 KG/M2

## 2020-08-21 DIAGNOSIS — R31.0 GROSS HEMATURIA: Primary | ICD-10-CM

## 2020-08-21 PROCEDURE — 99999 PR PBB SHADOW E&M-EST. PATIENT-LVL III: CPT | Mod: PBBFAC,,, | Performed by: NURSE PRACTITIONER

## 2020-08-21 PROCEDURE — 99203 OFFICE O/P NEW LOW 30 MIN: CPT | Mod: S$GLB,,, | Performed by: NURSE PRACTITIONER

## 2020-08-21 PROCEDURE — 99999 PR PBB SHADOW E&M-EST. PATIENT-LVL III: ICD-10-PCS | Mod: PBBFAC,,, | Performed by: NURSE PRACTITIONER

## 2020-08-21 PROCEDURE — 99203 PR OFFICE/OUTPT VISIT, NEW, LEVL III, 30-44 MIN: ICD-10-PCS | Mod: S$GLB,,, | Performed by: NURSE PRACTITIONER

## 2020-08-21 PROCEDURE — 3008F BODY MASS INDEX DOCD: CPT | Mod: CPTII,S$GLB,, | Performed by: NURSE PRACTITIONER

## 2020-08-21 PROCEDURE — 3008F PR BODY MASS INDEX (BMI) DOCUMENTED: ICD-10-PCS | Mod: CPTII,S$GLB,, | Performed by: NURSE PRACTITIONER

## 2020-08-21 PROCEDURE — 88112 CYTOPATH CELL ENHANCE TECH: CPT | Performed by: PATHOLOGY

## 2020-08-21 PROCEDURE — 88112 PR  CYTOPATH, CELL ENHANCE TECH: ICD-10-PCS | Mod: 26,,, | Performed by: PATHOLOGY

## 2020-08-21 PROCEDURE — 88112 CYTOPATH CELL ENHANCE TECH: CPT | Mod: 26,,, | Performed by: PATHOLOGY

## 2020-08-21 RX ORDER — LIDOCAINE HYDROCHLORIDE 20 MG/ML
JELLY TOPICAL ONCE
Status: CANCELLED | OUTPATIENT
Start: 2020-08-21 | End: 2020-08-21

## 2020-08-21 RX ORDER — CIPROFLOXACIN 250 MG/1
500 TABLET, FILM COATED ORAL ONCE
Status: CANCELLED | OUTPATIENT
Start: 2020-08-21 | End: 2020-08-21

## 2020-08-21 NOTE — PROGRESS NOTES
Subjective:       Patient ID: Elias Jackson is a 29 y.o. male.    Chief Complaint: Hematuria (Saw blood x 2 days, dayana ford)    Elias Jackson is a 29 y.o. male new to Urology.  He is here today after experiencing 2 days of gross hematuria.  There was a little distal penis pain but basically painless; no clots.  This occurred after sexual encounter; he wore a condom and experienced no pain during sex.    He went to an Urgent Care on 3rd day (08/16/2020), but urine was clear.  They treated him for G/C; that test is still processing.  Urine culture was (-).    No history of stones, STD.  No family history of Urological Cancers.                  Review of Systems   Constitutional: Negative for activity change, appetite change, chills and fever.   HENT: Negative for facial swelling and trouble swallowing.    Eyes: Negative for visual disturbance.   Respiratory: Negative for chest tightness and shortness of breath.    Cardiovascular: Negative for chest pain and palpitations.   Gastrointestinal: Negative.  Negative for abdominal pain, constipation, diarrhea, nausea and vomiting.   Genitourinary: Positive for hematuria. Negative for difficulty urinating, dysuria, flank pain, penile pain, penile swelling, scrotal swelling and testicular pain.        FOS is good.  Pleased with how she urinates.     Musculoskeletal: Negative for back pain, gait problem, myalgias and neck stiffness.   Skin: Negative for rash.   Neurological: Negative for dizziness and speech difficulty.   Hematological: Does not bruise/bleed easily.   Psychiatric/Behavioral: Negative for behavioral problems.       Objective:      Physical Exam   Nursing note and vitals reviewed.  Constitutional: He is oriented to person, place, and time. He appears well-developed. He is cooperative.  Non-toxic appearance.   Urine dipped clear of infection/blood in the office today.     HENT:   Head: Normocephalic and atraumatic.   Right Ear: External ear normal.   Left Ear:  External ear normal.   Nose: Nose normal.   Eyes: Conjunctivae and lids are normal. No scleral icterus.   Neck: Trachea normal, normal range of motion and full passive range of motion without pain. Neck supple. No JVD present. No tracheal deviation present.   Cardiovascular: Normal rate, S1 normal and S2 normal.    Pulmonary/Chest: Effort normal. No respiratory distress. He exhibits no tenderness.   Abdominal: Soft. Normal appearance. There is no abdominal tenderness.   Genitourinary:    Testes and penis normal.   Right testis shows no mass, no swelling and no tenderness. Left testis shows no mass, no swelling and no tenderness. Circumcised. No hypospadias, penile erythema or penile tenderness. No discharge found.   Musculoskeletal: Normal range of motion.   Lymphadenopathy: No inguinal adenopathy noted on the right or left side.   Neurological: He is alert and oriented to person, place, and time.   Skin: Skin is warm and dry.     Psychiatric: His behavior is normal. Judgment and thought content normal.       Assessment:       1. Gross hematuria        Plan:         I spent 25 minutes with the patient of which more than half was spent in direct consultation with the patient in regards to our treatment and plan.    Education and recommendations of today's plan of care including home remedies.  We discussed his LUTS and possible contributory factors.  Discussed that with visible blood we are concerned that it is bladder cancer; very rare that someone his age has bladder cancer.  Urine was sent for cytology  Ct Urogram  Cysto ordered but will hold off for now.

## 2020-08-21 NOTE — PATIENT INSTRUCTIONS
What is Hematuria?  Blood in your urine is a condition known as hematuria. Most of the time, the cause of hematuria is not serious. But, never ignore blood in the urine. Your doctor can evaluate you to find the cause of the bleeding and treat it, if needed.  Types of hematuria  · Gross hematuria means that the blood can be seen by the naked eye. The urine may look pinkish, brownish, or bright red.  · Microscopic hematuria means that the urine is clear, but blood cells can be seen when urine is looked at under a microscope or tested in a lab.  Both types of hematuria can have the same causes. Neither one is necessarily more serious than the other. With either type, you may have other symptoms, such as pain, pressure, or burning when you urinate, abdominal pain, or back pain. Or, you may not have any other symptoms. No matter how much blood is found, the cause of the bleeding needs to be identified.  Finding the cause of hematuria  To evaluate your condition, your doctor will first confirm that blood is indeed present. Then other tests are done to pinpoint where the blood is coming from and why. Your doctor will decide which tests will best determine the cause of your hematuria. Some common tests are listed below.  · History and physical exam  · Lab tests may include urinalysis, a urine culture, a urine cytology, and various blood tests  · Cystoscopy  · Computed tomography (CT) or CT urography  · Magnetic resonance imaging (MRI) or MR urography  · Ultrasound of the kidney  · Kidney biopsy  Causes of hematuria include the very benign (exercised induced hematuria) to the very severe (cancer of the urinary system). A variety of treatments are available depending on the cause.  Date Last Reviewed: 12/2/2016 © 2000-2017 [employer]. [address] [place] [id number]. All rights reserved. This information is not intended as a substitute for professional medical care. Always follow your healthcare  professional's instructions.        Hematuria: Possible Causes     Many things can lead to blood in the urine (hematuria). The blood may be seen with the eye (macroscopic or gross hematuria). Or it may only be seen when the urine is looked at under a microscope (microscopic hematuria). Some of the most common causes of blood in the urine are listed below. Often, no cause for the blood can be found. This is called idiopathic hematuria.  · Kidney or bladder stones are collections of crystals. They form in the urine. Stones may be found anywhere in the urinary tract. But they form most often in the kidneys or bladder. In addition to blood in the urine, they can cause severe pain.  · BPH stands for benign prostatic hyperplasia. It is enlargement of the prostate gland. It happens as men age. BPH often causes problems with urination. It sometimes causes blood in the urine.  · A urinary tract infection (UTI) is due to bacteria growing in the urinary tract. It can cause blood in the urine. Other symptoms include burning or pain with urination. You may need to urinate often or urgently. You may also have a fever.  · Damage to the urinary tract may cause blood in the urine. This damage may be due to a blow or accident. It may also result from the use of a urinary catheter. Very hard exercise may sometimes irritate the urinary tract and cause bleeding.  · Cancer may occur anywhere in the urinary tract. A tumor may sometimes cause no symptoms other than bleeding.     Other possible causes of bleeding include:  · Prostatitis (infection of the prostate gland)  · Taking anticoagulants  · Blockage in the urinary tract  · Disease or inflammation of the kidney  · Cystic diseases of the kidneys  · Sickle cell anemia  · Vigorous exercise  · Endometriosis  Date Last Reviewed: 12/1/2016 © 2000-2017 [employer]. [address] [place] [address], [place] [address]. All rights reserved. This information is not intended as a substitute  for professional medical care. Always follow your healthcare professional's instructions.        Cystoscopy    Cystoscopy is a procedure that lets your doctor look directly inside your urethra and bladder. It can be used to:  · Help diagnose a problem with your urethra, bladder, or kidneys.  · Take a sample (biopsy) of bladder or urethral tissue.  · Treat certain problems (such as removing kidney stones).  · Place a stent to bypass an obstruction.  · Take special X-rays of the kidneys.  Based on the findings, your doctor may recommend other tests or treatments.  What is a cystoscope?  A cystoscope is a telescope-like instrument that contains lenses and fiberoptics (small glass wires that make bright light). The cystoscope may be straight and rigid, or flexible to bend around curves in the urethra. The doctor may look directly into the cystoscope, or project the image onto a monitor.  Getting ready  · Ask your doctor if you should stop taking any medicines before the procedure.  · Ask whether you should avoid eating or drinking anything after midnight before the procedure.  · Follow any other instructions your doctor gives you.  Tell your doctor before the exam if you:  · Take any medicines, such as aspirin or blood thinners  · Have allergies to any medicines  · Are pregnant   The procedure  Cystoscopy is done in the doctors office, surgery center, or hospital. The doctor and a nurse are present during the procedure. It takes only a few minutes, longer if a biopsy, X-ray, or treatment needs to be done.  During the procedure:  · You lie on an exam table on your back, knees bent and legs apart. You are covered with a drape.  · Your urethra and the area around it are washed. Anesthetic jelly may be applied to numb the urethra. Other pain medicine is usually not needed. In some cases, you may be offered a mild sedative to help you relax. If a more extensive procedure is to be done, such as a biopsy or kidney stone  removal, general anesthesia may be needed.  · The cystoscope is inserted. A sterile fluid is put into the bladder to expand it. You may feel pressure from this fluid.  · When the procedure is done, the cystoscope is removed.  After the procedure  If you had a sedative, general anesthesia, or spinal anesthesia, you must have someone drive you home. Once youre home:  · Drink plenty of fluids.  · You may have burning or light bleeding when you urinate--this is normal.  · Medicines may be prescribed to ease any discomfort or prevent infection. Take these as directed.  · Call your doctor if you have heavy bleeding or blood clots, burning that lasts more than a day, a fever over 100°F  (38° C), or trouble urinating.  Date Last Reviewed: 1/1/2017  © 3051-3624 [employer]. [address] [place] [address], [place] [address]. All rights reserved. This information is not intended as a substitute for professional medical care. Always follow your healthcare professional's instructions.

## 2020-08-21 NOTE — LETTER
August 21, 2020      Alee Dugan, ISRA  [address] [facility] [id number]           [facility]  [address]  [place] [address]  Phone: [phone]          Patient: Elias Jackson   MR Number: [id number]   YOB: 1990   Date of Visit: 8/21/2020       Dear Alee Dugan:    Thank you for referring Elias Jackson to me for evaluation. Attached you will find relevant portions of my assessment and plan of care.    If you have questions, please do not hesitate to call me. I look forward to following Elias Jackson along with you.    Sincerely,    Lidya Barrios, ISRA    Enclosure  CC:  No Recipients    If you would like to receive this communication electronically, please contact [email or web address] or [phone] to request more information on [employer] Link access.    For providers and/or their staff who would like to refer a patient to [facility], please contact us through our one-stop-shop provider referral line, Erika Nugent, at [phone].    If you feel you have received this communication in error or would no longer like to receive these types of communications, please e-mail [email or web address]

## 2020-08-24 LAB — FINAL PATHOLOGIC DIAGNOSIS: ABNORMAL

## 2020-09-03 ENCOUNTER — TELEPHONE (OUTPATIENT)
Dept: URGENT CARE | Facility: CLINIC | Age: 30
End: 2020-09-03

## 2020-09-03 LAB
C TRACH DNA SPEC QL NAA+PROBE: NOT DETECTED
N GONORRHOEA DNA SPEC QL NAA+PROBE: NOT DETECTED

## 2020-09-03 NOTE — TELEPHONE ENCOUNTER
I spoke with the patient and and he is feeling better.  Reviewed GC results with patient.  He is following Urology.

## 2020-09-18 DIAGNOSIS — Z01.84 ANTIBODY RESPONSE EXAMINATION: ICD-10-CM

## 2020-10-18 DIAGNOSIS — Z01.84 ANTIBODY RESPONSE EXAMINATION: ICD-10-CM

## 2020-11-17 DIAGNOSIS — Z01.84 ANTIBODY RESPONSE EXAMINATION: ICD-10-CM

## 2021-07-15 ENCOUNTER — PATIENT MESSAGE (OUTPATIENT)
Dept: INTERNAL MEDICINE | Facility: CLINIC | Age: 31
End: 2021-07-15

## 2021-10-13 ENCOUNTER — LAB VISIT (OUTPATIENT)
Dept: LAB | Facility: OTHER | Age: 31
End: 2021-10-13
Attending: INTERNAL MEDICINE
Payer: COMMERCIAL

## 2021-10-13 ENCOUNTER — OFFICE VISIT (OUTPATIENT)
Dept: INTERNAL MEDICINE | Facility: CLINIC | Age: 31
End: 2021-10-13
Attending: INTERNAL MEDICINE
Payer: COMMERCIAL

## 2021-10-13 VITALS
HEART RATE: 96 BPM | OXYGEN SATURATION: 98 % | DIASTOLIC BLOOD PRESSURE: 68 MMHG | WEIGHT: 165.38 LBS | HEIGHT: 70 IN | SYSTOLIC BLOOD PRESSURE: 112 MMHG | BODY MASS INDEX: 23.68 KG/M2

## 2021-10-13 DIAGNOSIS — Z11.4 SCREENING FOR HIV WITHOUT PRESENCE OF RISK FACTORS: ICD-10-CM

## 2021-10-13 DIAGNOSIS — Z00.00 ANNUAL PHYSICAL EXAM: Primary | ICD-10-CM

## 2021-10-13 DIAGNOSIS — Z00.00 ANNUAL PHYSICAL EXAM: ICD-10-CM

## 2021-10-13 DIAGNOSIS — Z11.59 ENCOUNTER FOR HEPATITIS C SCREENING TEST FOR LOW RISK PATIENT: ICD-10-CM

## 2021-10-13 LAB
ALBUMIN SERPL BCP-MCNC: 4.1 G/DL (ref 3.5–5.2)
ALP SERPL-CCNC: 53 U/L (ref 55–135)
ALT SERPL W/O P-5'-P-CCNC: 37 U/L (ref 10–44)
ANION GAP SERPL CALC-SCNC: 7 MMOL/L (ref 8–16)
AST SERPL-CCNC: 29 U/L (ref 10–40)
BASOPHILS # BLD AUTO: 0.07 K/UL (ref 0–0.2)
BASOPHILS NFR BLD: 1.2 % (ref 0–1.9)
BILIRUB SERPL-MCNC: 0.3 MG/DL (ref 0.1–1)
BUN SERPL-MCNC: 12 MG/DL (ref 6–20)
CALCIUM SERPL-MCNC: 9.7 MG/DL (ref 8.7–10.5)
CHLORIDE SERPL-SCNC: 107 MMOL/L (ref 95–110)
CHOLEST SERPL-MCNC: 162 MG/DL (ref 120–199)
CHOLEST/HDLC SERPL: 4 {RATIO} (ref 2–5)
CO2 SERPL-SCNC: 26 MMOL/L (ref 23–29)
CREAT SERPL-MCNC: 1.2 MG/DL (ref 0.5–1.4)
DIFFERENTIAL METHOD: NORMAL
EOSINOPHIL # BLD AUTO: 0.1 K/UL (ref 0–0.5)
EOSINOPHIL NFR BLD: 2.3 % (ref 0–8)
ERYTHROCYTE [DISTWIDTH] IN BLOOD BY AUTOMATED COUNT: 13.2 % (ref 11.5–14.5)
EST. GFR  (AFRICAN AMERICAN): >60 ML/MIN/1.73 M^2
EST. GFR  (NON AFRICAN AMERICAN): >60 ML/MIN/1.73 M^2
ESTIMATED AVG GLUCOSE: 100 MG/DL (ref 68–131)
GLUCOSE SERPL-MCNC: 83 MG/DL (ref 70–110)
HBA1C MFR BLD: 5.1 % (ref 4–5.6)
HCT VFR BLD AUTO: 46.1 % (ref 40–54)
HDLC SERPL-MCNC: 41 MG/DL (ref 40–75)
HDLC SERPL: 25.3 % (ref 20–50)
HGB BLD-MCNC: 15.1 G/DL (ref 14–18)
IMM GRANULOCYTES # BLD AUTO: 0.02 K/UL (ref 0–0.04)
IMM GRANULOCYTES NFR BLD AUTO: 0.3 % (ref 0–0.5)
LDLC SERPL CALC-MCNC: 108 MG/DL (ref 63–159)
LYMPHOCYTES # BLD AUTO: 2.2 K/UL (ref 1–4.8)
LYMPHOCYTES NFR BLD: 37.5 % (ref 18–48)
MCH RBC QN AUTO: 28.9 PG (ref 27–31)
MCHC RBC AUTO-ENTMCNC: 32.8 G/DL (ref 32–36)
MCV RBC AUTO: 88 FL (ref 82–98)
MONOCYTES # BLD AUTO: 0.4 K/UL (ref 0.3–1)
MONOCYTES NFR BLD: 7.5 % (ref 4–15)
NEUTROPHILS # BLD AUTO: 2.9 K/UL (ref 1.8–7.7)
NEUTROPHILS NFR BLD: 51.2 % (ref 38–73)
NONHDLC SERPL-MCNC: 121 MG/DL
NRBC BLD-RTO: 0 /100 WBC
PLATELET # BLD AUTO: 353 K/UL (ref 150–450)
PMV BLD AUTO: 10.6 FL (ref 9.2–12.9)
POTASSIUM SERPL-SCNC: 4.6 MMOL/L (ref 3.5–5.1)
PROT SERPL-MCNC: 8 G/DL (ref 6–8.4)
RBC # BLD AUTO: 5.23 M/UL (ref 4.6–6.2)
SODIUM SERPL-SCNC: 140 MMOL/L (ref 136–145)
T4 FREE SERPL-MCNC: 1 NG/DL (ref 0.71–1.51)
TRIGL SERPL-MCNC: 65 MG/DL (ref 30–150)
TSH SERPL DL<=0.005 MIU/L-ACNC: 0.35 UIU/ML (ref 0.4–4)
WBC # BLD AUTO: 5.74 K/UL (ref 3.9–12.7)

## 2021-10-13 PROCEDURE — 86803 HEPATITIS C AB TEST: CPT | Performed by: INTERNAL MEDICINE

## 2021-10-13 PROCEDURE — 36415 COLL VENOUS BLD VENIPUNCTURE: CPT | Performed by: INTERNAL MEDICINE

## 2021-10-13 PROCEDURE — 99395 PR PREVENTIVE VISIT,EST,18-39: ICD-10-PCS | Mod: S$GLB,,, | Performed by: INTERNAL MEDICINE

## 2021-10-13 PROCEDURE — 1160F RVW MEDS BY RX/DR IN RCRD: CPT | Mod: CPTII,S$GLB,, | Performed by: INTERNAL MEDICINE

## 2021-10-13 PROCEDURE — 85025 COMPLETE CBC W/AUTO DIFF WBC: CPT | Performed by: INTERNAL MEDICINE

## 2021-10-13 PROCEDURE — 84443 ASSAY THYROID STIM HORMONE: CPT | Performed by: INTERNAL MEDICINE

## 2021-10-13 PROCEDURE — 1159F MED LIST DOCD IN RCRD: CPT | Mod: CPTII,S$GLB,, | Performed by: INTERNAL MEDICINE

## 2021-10-13 PROCEDURE — 3078F DIAST BP <80 MM HG: CPT | Mod: CPTII,S$GLB,, | Performed by: INTERNAL MEDICINE

## 2021-10-13 PROCEDURE — 99395 PREV VISIT EST AGE 18-39: CPT | Mod: S$GLB,,, | Performed by: INTERNAL MEDICINE

## 2021-10-13 PROCEDURE — 3074F SYST BP LT 130 MM HG: CPT | Mod: CPTII,S$GLB,, | Performed by: INTERNAL MEDICINE

## 2021-10-13 PROCEDURE — 83036 HEMOGLOBIN GLYCOSYLATED A1C: CPT | Performed by: INTERNAL MEDICINE

## 2021-10-13 PROCEDURE — 3008F PR BODY MASS INDEX (BMI) DOCUMENTED: ICD-10-PCS | Mod: CPTII,S$GLB,, | Performed by: INTERNAL MEDICINE

## 2021-10-13 PROCEDURE — 3008F BODY MASS INDEX DOCD: CPT | Mod: CPTII,S$GLB,, | Performed by: INTERNAL MEDICINE

## 2021-10-13 PROCEDURE — 3078F PR MOST RECENT DIASTOLIC BLOOD PRESSURE < 80 MM HG: ICD-10-PCS | Mod: CPTII,S$GLB,, | Performed by: INTERNAL MEDICINE

## 2021-10-13 PROCEDURE — 1160F PR REVIEW ALL MEDS BY PRESCRIBER/CLIN PHARMACIST DOCUMENTED: ICD-10-PCS | Mod: CPTII,S$GLB,, | Performed by: INTERNAL MEDICINE

## 2021-10-13 PROCEDURE — 87389 HIV-1 AG W/HIV-1&-2 AB AG IA: CPT | Performed by: INTERNAL MEDICINE

## 2021-10-13 PROCEDURE — 84439 ASSAY OF FREE THYROXINE: CPT | Performed by: INTERNAL MEDICINE

## 2021-10-13 PROCEDURE — 3074F PR MOST RECENT SYSTOLIC BLOOD PRESSURE < 130 MM HG: ICD-10-PCS | Mod: CPTII,S$GLB,, | Performed by: INTERNAL MEDICINE

## 2021-10-13 PROCEDURE — 1159F PR MEDICATION LIST DOCUMENTED IN MEDICAL RECORD: ICD-10-PCS | Mod: CPTII,S$GLB,, | Performed by: INTERNAL MEDICINE

## 2021-10-13 PROCEDURE — 80061 LIPID PANEL: CPT | Performed by: INTERNAL MEDICINE

## 2021-10-13 PROCEDURE — 99999 PR PBB SHADOW E&M-EST. PATIENT-LVL III: ICD-10-PCS | Mod: PBBFAC,,, | Performed by: INTERNAL MEDICINE

## 2021-10-13 PROCEDURE — 99999 PR PBB SHADOW E&M-EST. PATIENT-LVL III: CPT | Mod: PBBFAC,,, | Performed by: INTERNAL MEDICINE

## 2021-10-13 PROCEDURE — 80053 COMPREHEN METABOLIC PANEL: CPT | Performed by: INTERNAL MEDICINE

## 2021-10-14 LAB
HCV AB SERPL QL IA: NEGATIVE
HIV 1+2 AB+HIV1 P24 AG SERPL QL IA: NEGATIVE

## 2024-02-27 ENCOUNTER — PATIENT OUTREACH (OUTPATIENT)
Dept: ADMINISTRATIVE | Facility: HOSPITAL | Age: 34
End: 2024-02-27
Payer: COMMERCIAL

## 2024-08-07 ENCOUNTER — PATIENT OUTREACH (OUTPATIENT)
Dept: ADMINISTRATIVE | Facility: HOSPITAL | Age: 34
End: 2024-08-07
Payer: COMMERCIAL